# Patient Record
Sex: FEMALE | Race: WHITE | Employment: UNEMPLOYED | URBAN - METROPOLITAN AREA
[De-identification: names, ages, dates, MRNs, and addresses within clinical notes are randomized per-mention and may not be internally consistent; named-entity substitution may affect disease eponyms.]

---

## 2018-08-31 ENCOUNTER — TELEPHONE (OUTPATIENT)
Dept: FAMILY MEDICINE CLINIC | Age: 28
End: 2018-08-31

## 2018-08-31 ENCOUNTER — OFFICE VISIT (OUTPATIENT)
Dept: FAMILY MEDICINE CLINIC | Age: 28
End: 2018-08-31

## 2018-08-31 VITALS
WEIGHT: 125 LBS | OXYGEN SATURATION: 97 % | HEART RATE: 57 BPM | TEMPERATURE: 98.2 F | SYSTOLIC BLOOD PRESSURE: 116 MMHG | BODY MASS INDEX: 21.34 KG/M2 | DIASTOLIC BLOOD PRESSURE: 72 MMHG | RESPIRATION RATE: 18 BRPM | HEIGHT: 64 IN

## 2018-08-31 DIAGNOSIS — Z13.89: ICD-10-CM

## 2018-08-31 DIAGNOSIS — J45.909 UNCOMPLICATED ASTHMA, UNSPECIFIED ASTHMA SEVERITY, UNSPECIFIED WHETHER PERSISTENT: Primary | ICD-10-CM

## 2018-08-31 RX ORDER — ALBUTEROL SULFATE 0.83 MG/ML
2.5 SOLUTION RESPIRATORY (INHALATION) ONCE
Qty: 1 EACH | Refills: 0
Start: 2018-08-31 | End: 2018-08-31

## 2018-08-31 RX ORDER — ALBUTEROL SULFATE 90 UG/1
2 AEROSOL, METERED RESPIRATORY (INHALATION)
Qty: 1 INHALER | Refills: 1 | Status: SHIPPED | OUTPATIENT
Start: 2018-08-31 | End: 2019-07-08 | Stop reason: SDUPTHER

## 2018-08-31 NOTE — PROGRESS NOTES
1. Have you been to the ER, urgent care clinic since your last visit? Hospitalized since your last visit? Yes, [facility]    2. Have you seen or consulted any other health care providers outside of the [address] [place] [address] since your last visit? Include any pap smears or colon screening. No    Chief Complaint   Patient presents with    Other     chest congestion       Patient states has Nexplanon removal 08/30/18. Blood pressure 116/72, pulse (!) 57, temperature 98.2 °F (36.8 °C), temperature source Oral, resp. rate 18, height 5' 4\" (1.626 m), weight 125 lb (56.7 kg), SpO2 97 %, unknown if currently breastfeeding.

## 2018-08-31 NOTE — TELEPHONE ENCOUNTER
Patient called in stating that the Qvar that was sent in is not covered by her insurance.  She would like to know if there is an alternative that can be sent in?

## 2018-08-31 NOTE — MR AVS SNAPSHOT
[address] [phone] Patient: Attila Whalen MRN: [id number] XQE:[birth year] Visit Information Date & Time Provider Department Dept. Phone Encounter #  
 8/31/2018 10:45 AM Sergio Green MD [address] [place] [address] [phone] [id number] Upcoming Health Maintenance Date Due  
 HPV Age 9Y-34Y (1 of 3 - Female 3 Dose Series) 8/3/2003 Pneumococcal 19-64 Medium Risk (1 of 1 - PPSV23) 8/3/2011 PAP AKA CERVICAL CYTOLOGY 8/3/2013 Influenza Age 5 to Adult 8/1/2018 DTaP/Tdap/Td series (2 - Td) 6/21/2024 Allergies as of 8/31/2018  Review Complete On: 8/31/2018 By: Joann Daniels LPN Severity Noted Reaction Type Reactions Sulfa (Sulfonamide Antibiotics)  03/23/2011    Hives Current Immunizations  Reviewed on 8/31/2018 Name Date Tdap 6/21/2014  2:40 PM  
  
 Reviewed by Joann Daniels LPN on 2/95/8975 at 24:56 AM  
You Were Diagnosed With   
  
 Codes Comments Uncomplicated asthma, unspecified asthma severity, unspecified whether persistent    -  Primary ICD-10-CM: J45.909 ICD-9-CM: 493.90 Vitals BP Pulse Temp Resp Height(growth percentile) Weight(growth percentile) 116/72 (BP 1 Location: Right arm, BP Patient Position: Sitting) (!) 57 98.2 °F (36.8 °C) (Oral) 18 5' 4\" (1.626 m) 125 lb (56.7 kg) SpO2 Breastfeeding? BMI OB Status Smoking Status 97% Unknown 21.46 kg/m2 Chemically Induced Current Every Day Smoker BMI and BSA Data Body Mass Index Body Surface Area  
 21.46 kg/m 2 1.6 m 2 Preferred Pharmacy Pharmacy Name Phone [facility] [address] [phone] Your Updated Medication List  
  
   
This list is accurate as of 8/31/18 11:23 AM.  Always use your most recent med list.  
  
  
  
  
 * albuterol 2.5 mg /3 mL (0.083 %) nebulizer solution Commonly known as:  PROVENTIL VENTOLIN  
3 mL by Nebulization route once for 1 dose. * albuterol 90 mcg/actuation inhaler Commonly known as:  VENTOLIN HFA Take 2 Puffs by inhalation every six (6) hours as needed for Wheezing. beclomethasone 80 mcg/actuation [employer] Commonly known as:  QVAR Take 1 Puff by inhalation two (2) times a day. mupirocin 2 % ointment Commonly known as:  [facility] Apply  to affected area three (3) times daily. Apply to area for 10 days * Notice: This list has 2 medication(s) that are the same as other medications prescribed for you. Read the directions carefully, and ask your doctor or other care provider to review them with you. Prescriptions Sent to Pharmacy Refills  
 albuterol (VENTOLIN HFA) 90 mcg/actuation inhaler 1 Sig: Take 2 Puffs by inhalation every six (6) hours as needed for Wheezing. Class: Normal  
 Pharmacy: [facility] DR GUSTAVO RICHARD [address] [place] [address] Ph #: [phone] Route: Inhalation  
 beclomethasone (QVAR) 80 mcg/actuation aero 1 Sig: Take 1 Puff by inhalation two (2) times a day. Class: Normal  
 Pharmacy: [facility] DR UGSTAVO RICHARD [address] [place] [address] Ph #: [phone] Route: Inhalation We Performed the Following ALBUTEROL, INHAL. SOL., FDA-APPROVED FINAL, NON-COMPOUND UNIT DOSE, 1 MG [ [facility]] INHAL RX, AIRWAY OBST/DX SPUTUM INDUCT [id number] CPT(R)] Patient Instructions Asthma Action Plan: After Your Visit Your Care Instructions An asthma action plan is based on peak flow and asthma symptoms. Sorting symptoms and peak flow into red, yellow, and green \"zones\" can help you know how bad your asthma is and what actions you should take. Work with your doctor to make your plan. An action plan may include: · The peak flow readings and symptoms for each zone. · What medicines to take in each zone. · When to call a doctor. · A list of emergency contact numbers. · A list of your asthma triggers. Follow-up care is a key part of your treatment and safety. Be sure to make and go to all appointments, and call your doctor if you are having problems. It's also a good idea to know your test results and keep a list of the medicines you take. How can you care for yourself at home? · Take your daily medicines to help minimize long-term damage and avoid asthma attacks. · Check your peak flow every morning and evening. This is the best way to know how well your lungs are working. · Check your action plan to see what zone you are in. 
¨ If you are in the green zone, keep taking your daily asthma medicines as prescribed. ¨ If you are in the yellow zone, you may be having or will soon have an asthma attack. You may not have any symptoms, but your lungs are not working as well as they should. Take the medicines listed in your action plan. If you stay in the yellow zone, your doctor may need to increase the dose or add a medicine. ¨ If you are in the red zone, follow your action plan. If your symptoms or peak flow don't improve soon, you may need to go to the emergency room or be admitted to the hospital. 
· Use an asthma diary. Write down your peak flow readings in the asthma diary. If you have an attack, write down what caused it (if you know), the symptoms, and what medicine you took. · Make sure you know how and when to call your doctor or go to the hospital. 
· Take both the asthma action plan and the asthma diaryalong with your peak flow meter and medicineswhen you see your doctor. Tell your doctor if you are having trouble following your action plan. When should you call for help? Call 911 anytime you think you may need emergency care. For example, call if: 
· You have severe trouble breathing.  
Call your doctor now or seek immediate medical care if: 
· Your symptoms do not get better after you have followed your asthma action plan. · You cough up yellow, dark brown, or bloody mucus (sputum). Watch closely for changes in your health, and be sure to contact your doctor if: 
· Your coughing and wheezing get worse. · You need to use quick-relief medicine on more than 2 days a week (unless it is just for exercise). · You need help figuring out what is triggering your asthma attacks. Where can you learn more? Go to RFIDeas.be Enter 984 [id number] in the search box to learn more about \"Asthma Action Plan: After Your Visit. \"  
© 4972-3986 Healthwise, Incorporated. Care instructions adapted under license by [employer] (which disclaims liability or warranty for this information). This care instruction is for use with your licensed healthcare professional. If you have questions about a medical condition or this instruction, always ask your healthcare professional. [address] any warranty or liability for your use of this information. Content Version: 66.3.572314; Last Revised: March 9, 2012 Asthma Action Plan: After Your Visit Your Care Instructions An asthma action plan is based on peak flow and asthma symptoms. Sorting symptoms and peak flow into red, yellow, and green \"zones\" can help you know how bad your asthma is and what actions you should take. Work with your doctor to make your plan. An action plan may include: · The peak flow readings and symptoms for each zone. · What medicines to take in each zone. · When to call a doctor. · A list of emergency contact numbers. · A list of your asthma triggers. Follow-up care is a key part of your treatment and safety. Be sure to make and go to all appointments, and call your doctor if you are having problems. It's also a good idea to know your test results and keep a list of the medicines you take. How can you care for yourself at home?  
· Take your daily medicines to help minimize long-term damage and avoid asthma attacks. · Check your peak flow every morning and evening. This is the best way to know how well your lungs are working. · Check your action plan to see what zone you are in. 
¨ If you are in the green zone, keep taking your daily asthma medicines as prescribed. ¨ If you are in the yellow zone, you may be having or will soon have an asthma attack. You may not have any symptoms, but your lungs are not working as well as they should. Take the medicines listed in your action plan. If you stay in the yellow zone, your doctor may need to increase the dose or add a medicine. ¨ If you are in the red zone, follow your action plan. If your symptoms or peak flow don't improve soon, you may need to go to the emergency room or be admitted to the hospital. 
· Use an asthma diary. Write down your peak flow readings in the asthma diary. If you have an attack, write down what caused it (if you know), the symptoms, and what medicine you took. · Make sure you know how and when to call your doctor or go to the hospital. 
· Take both the asthma action plan and the asthma diaryalong with your peak flow meter and medicineswhen you see your doctor. Tell your doctor if you are having trouble following your action plan. When should you call for help? Call 911 anytime you think you may need emergency care. For example, call if: 
· You have severe trouble breathing. Call your doctor now or seek immediate medical care if: 
· Your symptoms do not get better after you have followed your asthma action plan. · You cough up yellow, dark brown, or bloody mucus (sputum). Watch closely for changes in your health, and be sure to contact your doctor if: 
· Your coughing and wheezing get worse. · You need to use quick-relief medicine on more than 2 days a week (unless it is just for exercise). · You need help figuring out what is triggering your asthma attacks. Where can you learn more? Go to DealExplorer.be Enter 933 [id number] in the search box to learn more about \"Asthma Action Plan: After Your Visit. \"  
© 6456-2939 Healthwise, Incorporated. Care instructions adapted under license by [facility] (which disclaims liability or warranty for this information). This care instruction is for use with your licensed healthcare professional. If you have questions about a medical condition or this instruction, always ask your healthcare professional. [address] [address] any warranty or liability for your use of this information. Content Version: 18.6.103807; Last Revised: March 9, 2012 Learning About Alcohol Misuse What is alcohol misuse? Alcohol misuse means drinking so much that it causes problems for you or others. Early problems with alcohol can start at home. You may argue with loved ones about how much you're drinking. Your job may be affected because of drinking. You may drink when it's dangerous or illegal, such as when you drive. Drinking too much for a long time can lead to health conditions like high blood pressure and liver problems. What are the symptoms? Symptoms of alcohol misuse may include: · Drinking much more than you planned. · Drinking even though it's causing problems for you or others. · Putting yourself in situations where you might get hurt. · Wanting to cut down or stop drinking, but not being able to. · Feeling guilty about how much you're drinking. How is alcohol misuse treated? Getting help for problems with alcohol is up to you. But you don't have to do it alone. There are many people and kinds of treatments to help with alcohol problems. Talking to your doctor is the first step. When you get a doctor's help, treatment for alcohol problems can be safer and quicker. Treatment options can include: · Treatment programs. Examples are group therapy, one or more types of counseling, and alcohol education. · Medicines. A doctor or counselor can help you know what kinds of medicines might help with cravings. · Free social support groups. These groups include AA (Alcoholics Anonymous) and SMART (Self-Management and Recovery Training). Your doctor can help you decide which type of program is best for you. Follow-up care is a key part of your treatment and safety. Be sure to make and go to all appointments, and call your doctor if you are having problems. It's also a good idea to know your test results and keep a list of the medicines you take. Where can you learn more? Go to http://haylieInOpenkatarina.info/. Enter [id number] in the search box to learn more about \"Learning About Alcohol Misuse. \" Current as of: October 9, 2017 Content Version: 11.7 © 4730-3321 [employer], Incorporated. Care instructions adapted under license by [employer] (which disclaims liability or warranty for this information). If you have questions about a medical condition or this instruction, always ask your healthcare professional. [address] [address] any warranty or liability for your use of this information. Learning About Alcohol Misuse What is alcohol misuse? Alcohol misuse means drinking so much that it causes problems for you or others. Early problems with alcohol can start at home. You may argue with loved ones about how much you're drinking. Your job may be affected because of drinking. You may drink when it's dangerous or illegal, such as when you drive. Drinking too much for a long time can lead to health conditions like high blood pressure and liver problems. What are the symptoms? Symptoms of alcohol misuse may include: · Drinking much more than you planned. · Drinking even though it's causing problems for you or others. · Putting yourself in situations where you might get hurt. · Wanting to cut down or stop drinking, but not being able to. · Feeling guilty about how much you're drinking. How is alcohol misuse treated? Getting help for problems with alcohol is up to you. But you don't have to do it alone. There are many people and kinds of treatments to help with alcohol problems. Talking to your doctor is the first step. When you get a doctor's help, treatment for alcohol problems can be safer and quicker. Treatment options can include: · Treatment programs. Examples are group therapy, one or more types of counseling, and alcohol education. · Medicines. A doctor or counselor can help you know what kinds of medicines might help with cravings. · Free social support groups. These groups include AA (Alcoholics Anonymous) and SMART (Self-Management and Recovery Training). Your doctor can help you decide which type of program is best for you. Follow-up care is a key part of your treatment and safety. Be sure to make and go to all appointments, and call your doctor if you are having problems. It's also a good idea to know your test results and keep a list of the medicines you take. Where can you learn more? Go to http://haylie-katarina.info/. Enter [id number] in the search box to learn more about \"Learning About Alcohol Misuse. \" Current as of: October 9, 2017 Content Version: 11.7 © 2533-1098 [employer]. Care instructions adapted under license by "[employer] (which disclaims liability or warranty for this information). If you have questions about a medical condition or this instruction, always ask your healthcare professional. [address] [address] any warranty or liability for your use of this information. Introducing [facility]! Ambrosio Garcia introduces W4 patient portal. Now you can access parts of your medical record, email your doctor's office, and request medication refills online. 1. In your internet browser, go to [email or web address] 2. Click on the First Time User? Click Here link in the Sign In box. You will see the New Member Sign Up page. 3. Enter your MediCard Access Code exactly as it appears below. You will not need to use this code after youve completed the sign-up process. If you do not sign up before the expiration date, you must request a new code. · MediCard Access Code: [email or web address] Expires: 11/29/2018 10:50 AM 
 
4. Enter the last four digits of your Social Security Number (xxxx) and Date of Birth (mm/dd/yyyy) as indicated and click Submit. You will be taken to the next sign-up page. 5. Create a MediCard ID. This will be your MediCard login ID and cannot be changed, so think of one that is secure and easy to remember. 6. Create a MediCard password. You can change your password at any time. 7. Enter your Password Reset Question and Answer. This can be used at a later time if you forget your password. 8. Enter your e-mail address. You will receive e-mail notification when new information is available in [address] 9. Click Sign Up. You can now view and download portions of your medical record. 10. Click the Download Summary menu link to download a portable copy of your medical information. If you have questions, please visit the Frequently Asked Questions section of the MediCard website. Remember, MediCard is NOT to be used for urgent needs. For medical emergencies, dial 911. Now available from your iPhone and Android! Please provide this summary of care documentation to your next provider. Your primary care clinician is listed as Tangela Biswas. If you have any questions after today's visit, please call [phone].

## 2018-08-31 NOTE — PROGRESS NOTES
[address] [facility] [address] 35   Office [phone], Fax [phone]      Subjective:     CC:cough and congestion  History provided by patient     HPI:  Mk Ventura is a 34 y.o. WHITE OR  female with significant history of Asthma  presenting for congestion and cough. Cough and congestion: Started 5 days ago. Report  Some sputum production. Reports that cough and congestion is worse in the morning. Reports wheezing. Has tried her sister's breathing treatment this morning, with melani relief. Patient non-compliant with medications and following up for asthma. Reports using inhaler on average 3 times a week and awakening at night time with cough at least one day a week. Patient chronic smoker. Smokes about 1ppd for the past 12 years. Works at [employer] as . Denies fever, chills, nausea or vomiting. Patient reports drinks 15-20 glasses of alcohol ( [place]ff) on a daily bases. C: yes  A: yes  G: no  E: No    Drugs: smokes THC    Patient not sexually active and LMP was 3 weeks ago. Of note, Had nexplanon removal procedure yesterday and had LFTs at [facility]. She doesn't know the result of LFTs but tolerated the procedure well. Review of Systems   Constitutional: Negative for chills and fever. HENT: Positive for congestion. Negative for ear pain and nosebleeds. Eyes: Negative for blurred vision. Respiratory: Positive for cough, sputum production, shortness of breath and wheezing. Negative for hemoptysis. Cardiovascular: Negative for chest pain, palpitations and leg swelling. Gastrointestinal: Positive for heartburn. Negative for abdominal pain, diarrhea, nausea and vomiting. Genitourinary: Negative for dysuria and urgency. Psychiatric/Behavioral: Negative for depression.        Past Medical History:   Diagnosis Date    Asthma     HSV infection January 2011       Current Outpatient Prescriptions on File Prior to Visit   Medication Sig Dispense Refill    mupirocin (BACTROBAN) 2 % ointment Apply  to affected area three (3) times daily. Apply to area for 10 days 22 g 0     No current facility-administered medications on file prior to visit. Allergies   Allergen Reactions    Sulfa (Sulfonamide Antibiotics) Hives       Past Surgical History:   Procedure Laterality Date    HX ENDOSCOPY      HX WISDOM TEETH EXTRACTION  3/12/10       Social History     Social History    Marital status: SINGLE     Spouse name: N/A    Number of children: N/A    Years of education: N/A     Occupational History    Not on file. Social History Main Topics    Smoking status: Current Every Day Smoker     Packs/day: 1.00     Years: 5.00     Types: Cigarettes    Smokeless tobacco: Never Used    Alcohol use No    Drug use: Yes     Special: Marijuana    Sexual activity: Yes     Partners: Male     Birth control/ protection: Condom, Pill     Other Topics Concern    Not on file     Social History Narrative       Patient Active Problem List   Diagnosis Code    RAD (reactive airway disease) J45.909    Lower back pain M54.5    Herpes B00.9    H/O drug abuse Z87.898    Tobacco abuse Z72.0         Objective:   Vitals - reviewed  Visit Vitals    /72 (BP 1 Location: Right arm, BP Patient Position: Sitting)    Pulse (!) 57    Temp 98.2 °F (36.8 °C) (Oral)    Resp 18    Ht 5' 4\" (1.626 m)    Wt 125 lb (56.7 kg)    SpO2 97%    Breastfeeding Unknown  Comment: patient had nexplanon removed 08/30/18    BMI 21.46 kg/m2        Physical Exam   Constitutional: She appears well-developed and well-nourished. HENT:   Head: Normocephalic and atraumatic. Eyes: Conjunctivae are normal. Pupils are equal, round, and reactive to light. Neck: Normal range of motion. Neck supple. Cardiovascular: Normal rate and regular rhythm. Pulmonary/Chest: Effort normal. No respiratory distress. She has wheezes. She exhibits no tenderness. Abdominal: Soft.  Bowel sounds are normal. She exhibits no distension. Assessment and orders:   Diagnoses and all orders for this visit:    1. Uncomplicated asthma, unspecified asthma severity, unspecified whether persistent: Mild-moderate persistent asthma refractory to NOLBERTO. Patient moving air but with expiratory wheezes throughout lung fields. Neb treatment here in the office. Will add inhaled corticosteroid to her regimen. -     albuterol (PROVENTIL VENTOLIN) 2.5 mg /3 mL (0.083 %) nebulizer solution; 3 mL by Nebulization route once for 1 dose. -     ALBUTEROL, INHAL. ZUW.()  -     INHAL RX, AIRWAY OBST/DX SPUTUM INDUCT ([id number])    2. Positive CAGE assessment for alcohol abuse  - Offered referral to addiction medicine for alcohol abuse but patient declines  - Deferring LFTs/CMP at annual physical exam per patient's request  - Discussed about dangers of alcohol including liver cirrhosis briefly  - Patient to make an appointment for annual physical exam    Other orders  -     albuterol (VENTOLIN HFA) 90 mcg/actuation inhaler; Take 2 Puffs by inhalation every six (6) hours as needed for Wheezing.  -     beclomethasone (QVAR) 80 mcg/actuation aero; Take 1 Puff by inhalation two (2) times a day. Pt was discussed with Dr. Tiffany Hoffman (attending physician). I have reviewed patient medical and social history and medications. I have reviewed pertinent labs results and other data. I have discussed the diagnosis with the patient and the intended plan as seen in the above orders. The patient has received an after-visit summary and questions were answered concerning future plans. I have discussed medication side effects and warnings with the patient as well.     Hermilo Hernandez MD  Resident [facility]  09/03/18

## 2018-08-31 NOTE — PATIENT INSTRUCTIONS
Asthma Action Plan: After Your Visit  Your Care Instructions  An asthma action plan is based on peak flow and asthma symptoms. Sorting symptoms and peak flow into red, yellow, and green \"zones\" can help you know how bad your asthma is and what actions you should take. Work with your doctor to make your plan. An action plan may include:  · The peak flow readings and symptoms for each zone. · What medicines to take in each zone. · When to call a doctor. · A list of emergency contact numbers. · A list of your asthma triggers. Follow-up care is a key part of your treatment and safety. Be sure to make and go to all appointments, and call your doctor if you are having problems. It's also a good idea to know your test results and keep a list of the medicines you take. How can you care for yourself at home? · Take your daily medicines to help minimize long-term damage and avoid asthma attacks. · Check your peak flow every morning and evening. This is the best way to know how well your lungs are working. · Check your action plan to see what zone you are in.  ¨ If you are in the green zone, keep taking your daily asthma medicines as prescribed. ¨ If you are in the yellow zone, you may be having or will soon have an asthma attack. You may not have any symptoms, but your lungs are not working as well as they should. Take the medicines listed in your action plan. If you stay in the yellow zone, your doctor may need to increase the dose or add a medicine. ¨ If you are in the red zone, follow your action plan. If your symptoms or peak flow don't improve soon, you may need to go to the emergency room or be admitted to the hospital.  · Use an asthma diary. Write down your peak flow readings in the asthma diary. If you have an attack, write down what caused it (if you know), the symptoms, and what medicine you took.   · Make sure you know how and when to call your doctor or go to the hospital.  · Take both the asthma action plan and the asthma diary--along with your peak flow meter and medicines--when you see your doctor. Tell your doctor if you are having trouble following your action plan. When should you call for help? Call 911 anytime you think you may need emergency care. For example, call if:  · You have severe trouble breathing. Call your doctor now or seek immediate medical care if:  · Your symptoms do not get better after you have followed your asthma action plan. · You cough up yellow, dark brown, or bloody mucus (sputum). Watch closely for changes in your health, and be sure to contact your doctor if:  · Your coughing and wheezing get worse. · You need to use quick-relief medicine on more than 2 days a week (unless it is just for exercise). · You need help figuring out what is triggering your asthma attacks. Where can you learn more? Go to [employer]  Enter B511 in the search box to learn more about \"Asthma Action Plan: After Your Visit. \"   © 6678-2424 Healthwise, Incorporated. Care instructions adapted under license by [facility] (which disclaims liability or warranty for this information). This care instruction is for use with your licensed healthcare professional. If you have questions about a medical condition or this instruction, always ask your healthcare professional. [address] any warranty or liability for your use of this information. Content Version: 02.8.825139; Last Revised: March 9, 2012              Asthma Action Plan: After Your Visit  Your Care Instructions  An asthma action plan is based on peak flow and asthma symptoms. Sorting symptoms and peak flow into red, yellow, and green \"zones\" can help you know how bad your asthma is and what actions you should take. Work with your doctor to make your plan. An action plan may include:  · The peak flow readings and symptoms for each zone. · What medicines to take in each zone.   · When to call a doctor. · A list of emergency contact numbers. · A list of your asthma triggers. Follow-up care is a key part of your treatment and safety. Be sure to make and go to all appointments, and call your doctor if you are having problems. It's also a good idea to know your test results and keep a list of the medicines you take. How can you care for yourself at home? · Take your daily medicines to help minimize long-term damage and avoid asthma attacks. · Check your peak flow every morning and evening. This is the best way to know how well your lungs are working. · Check your action plan to see what zone you are in.  ¨ If you are in the green zone, keep taking your daily asthma medicines as prescribed. ¨ If you are in the yellow zone, you may be having or will soon have an asthma attack. You may not have any symptoms, but your lungs are not working as well as they should. Take the medicines listed in your action plan. If you stay in the yellow zone, your doctor may need to increase the dose or add a medicine. ¨ If you are in the red zone, follow your action plan. If your symptoms or peak flow don't improve soon, you may need to go to the emergency room or be admitted to the hospital.  · Use an asthma diary. Write down your peak flow readings in the asthma diary. If you have an attack, write down what caused it (if you know), the symptoms, and what medicine you took. · Make sure you know how and when to call your doctor or go to the hospital.  · Take both the asthma action plan and the asthma diary--along with your peak flow meter and medicines--when you see your doctor. Tell your doctor if you are having trouble following your action plan. When should you call for help? Call 911 anytime you think you may need emergency care. For example, call if:  · You have severe trouble breathing.   Call your doctor now or seek immediate medical care if:  · Your symptoms do not get better after you have followed your asthma action plan. · You cough up yellow, dark brown, or bloody mucus (sputum). Watch closely for changes in your health, and be sure to contact your doctor if:  · Your coughing and wheezing get worse. · You need to use quick-relief medicine on more than 2 days a week (unless it is just for exercise). · You need help figuring out what is triggering your asthma attacks. Where can you learn more? Go to [employer]  Enter B511 in the search box to learn more about \"Asthma Action Plan: After Your Visit. \"   © 0229-3355 Healthwise, Incorporated. Care instructions adapted under license by [employer] (which disclaims liability or warranty for this information). This care instruction is for use with your licensed healthcare professional. If you have questions about a medical condition or this instruction, always ask your healthcare professional. [place] [address] any warranty or liability for your use of this information. Content Version: 57.9.693445; Last Revised: March 9, 2012                 Learning About Alcohol Misuse  What is alcohol misuse? Alcohol misuse means drinking so much that it causes problems for you or others. Early problems with alcohol can start at home. You may argue with loved ones about how much you're drinking. Your job may be affected because of drinking. You may drink when it's dangerous or illegal, such as when you drive. Drinking too much for a long time can lead to health conditions like high blood pressure and liver problems. What are the symptoms? Symptoms of alcohol misuse may include:  · Drinking much more than you planned. · Drinking even though it's causing problems for you or others. · Putting yourself in situations where you might get hurt. · Wanting to cut down or stop drinking, but not being able to. · Feeling guilty about how much you're drinking. How is alcohol misuse treated? Getting help for problems with alcohol is up to you. But you don't have to do it alone. There are many people and kinds of treatments to help with alcohol problems. Talking to your doctor is the first step. When you get a doctor's help, treatment for alcohol problems can be safer and quicker. Treatment options can include:  · Treatment programs. Examples are group therapy, one or more types of counseling, and alcohol education. · Medicines. A doctor or counselor can help you know what kinds of medicines might help with cravings. · Free social support groups. These groups include AA (Alcoholics Anonymous) and SMART (Self-Management and Recovery Training). Your doctor can help you decide which type of program is best for you. Follow-up care is a key part of your treatment and safety. Be sure to make and go to all appointments, and call your doctor if you are having problems. It's also a good idea to know your test results and keep a list of the medicines you take. Where can you learn more? Go to http://[employer]OneHealth Solutionskatarina.info/. Enter [id number] [id number] in the search box to learn more about \"Learning About Alcohol Misuse. \"  Current as of: October 9, 2017  Content Version: 11.7  © 0726-6199 [employer]. Care instructions adapted under license by [employer] (which disclaims liability or warranty for this information). If you have questions about a medical condition or this instruction, always ask your healthcare professional. [address] any warranty or liability for your use of this information. Learning About Alcohol Misuse  What is alcohol misuse? Alcohol misuse means drinking so much that it causes problems for you or others. Early problems with alcohol can start at home. You may argue with loved ones about how much you're drinking. Your job may be affected because of drinking. You may drink when it's dangerous or illegal, such as when you drive.   Drinking too much for a long time can lead to health conditions like high blood pressure and liver problems. What are the symptoms? Symptoms of alcohol misuse may include:  · Drinking much more than you planned. · Drinking even though it's causing problems for you or others. · Putting yourself in situations where you might get hurt. · Wanting to cut down or stop drinking, but not being able to. · Feeling guilty about how much you're drinking. How is alcohol misuse treated? Getting help for problems with alcohol is up to you. But you don't have to do it alone. There are many people and kinds of treatments to help with alcohol problems. Talking to your doctor is the first step. When you get a doctor's help, treatment for alcohol problems can be safer and quicker. Treatment options can include:  · Treatment programs. Examples are group therapy, one or more types of counseling, and alcohol education. · Medicines. A doctor or counselor can help you know what kinds of medicines might help with cravings. · Free social support groups. These groups include AA (Alcoholics Anonymous) and SMART (Self-Management and Recovery Training). Your doctor can help you decide which type of program is best for you. Follow-up care is a key part of your treatment and safety. Be sure to make and go to all appointments, and call your doctor if you are having problems. It's also a good idea to know your test results and keep a list of the medicines you take. Where can you learn more? Go to http://haylie-katarina.info/. Enter [id number] in the search box to learn more about \"Learning About Alcohol Misuse. \"  Current as of: October 9, 2017  Content Version: 11.7  © 4575-5857 [employer]. Care instructions adapted under license by [employer] (which disclaims liability or warranty for this information).  If you have questions about a medical condition or this instruction, always ask your healthcare professional. [place] [address] any warranty or liability for your use of this information.

## 2018-09-05 NOTE — TELEPHONE ENCOUNTER
Notified patient per Dr. Jerod Tucker alternative inhaler Rhenda Massing has been sent to pharmacy. Patient states she  picked up QVAR inhaler prescribed last Friday(8/31/18).

## 2019-07-08 ENCOUNTER — OFFICE VISIT (OUTPATIENT)
Dept: FAMILY MEDICINE CLINIC | Age: 29
End: 2019-07-08

## 2019-07-08 VITALS
HEIGHT: 64 IN | RESPIRATION RATE: 18 BRPM | HEART RATE: 98 BPM | BODY MASS INDEX: 20.01 KG/M2 | SYSTOLIC BLOOD PRESSURE: 127 MMHG | TEMPERATURE: 98.1 F | OXYGEN SATURATION: 98 % | WEIGHT: 117.2 LBS | DIASTOLIC BLOOD PRESSURE: 71 MMHG

## 2019-07-08 DIAGNOSIS — L02.91 ABSCESS: Primary | ICD-10-CM

## 2019-07-08 DIAGNOSIS — Z83.49 FAMILY HISTORY OF VITAMIN D DEFICIENCY: ICD-10-CM

## 2019-07-08 DIAGNOSIS — Z86.39 HISTORY OF HYPERTHYROIDISM: ICD-10-CM

## 2019-07-08 DIAGNOSIS — Z83.3 FAMILY HISTORY OF DIABETES MELLITUS (DM): ICD-10-CM

## 2019-07-08 DIAGNOSIS — F19.90 DRUG USE: ICD-10-CM

## 2019-07-08 DIAGNOSIS — Z00.00 WELLNESS EXAMINATION: ICD-10-CM

## 2019-07-08 RX ORDER — ALBUTEROL SULFATE 90 UG/1
2 AEROSOL, METERED RESPIRATORY (INHALATION)
Qty: 1 INHALER | Refills: 5 | Status: SHIPPED | OUTPATIENT
Start: 2019-07-08

## 2019-07-08 NOTE — PATIENT INSTRUCTIONS
Skin Abscess: Care Instructions  Your Care Instructions    A skin abscess is a bacterial infection that forms a pocket of pus. A boil is a kind of skin abscess. The doctor may have cut an opening in the abscess so that the pus can drain out. You may have gauze in the cut so that the abscess will stay open and keep draining. You may need antibiotics. You will need to follow up with your doctor to make sure the infection has gone away. The doctor has checked you carefully, but problems can develop later. If you notice any problems or new symptoms, get medical treatment right away. Follow-up care is a key part of your treatment and safety. Be sure to make and go to all appointments, and call your doctor if you are having problems. It's also a good idea to know your test results and keep a list of the medicines you take. How can you care for yourself at home? · Apply warm and dry compresses, a heating pad set on low, or a hot water bottle 3 or 4 times a day for pain. Keep a cloth between the heat source and your skin. · If your doctor prescribed antibiotics, take them as directed. Do not stop taking them just because you feel better. You need to take the full course of antibiotics. · Take pain medicines exactly as directed. ? If the doctor gave you a prescription medicine for pain, take it as prescribed. ? If you are not taking a prescription pain medicine, ask your doctor if you can take an over-the-counter medicine. · Keep your bandage clean and dry. Change the bandage whenever it gets wet or dirty, or at least one time a day. · If the abscess was packed with gauze:  ? Keep follow-up appointments to have the gauze changed or removed. If the doctor instructed you to remove the gauze, follow the instructions you were given for how to remove it. ? After the gauze is removed, soak the area in warm water for 15 to 20 minutes 2 times a day, until the wound closes. When should you call for help?   Call your doctor now or seek immediate medical care if:    · You have signs of worsening infection, such as:  ? Increased pain, swelling, warmth, or redness. ? Red streaks leading from the infected skin. ? Pus draining from the wound. ? A fever.    Watch closely for changes in your health, and be sure to contact your doctor if:    · You do not get better as expected. Where can you learn more? Go to http://haylie-katarina.info/. Enter J572 in the search box to learn more about \"Skin Abscess: Care Instructions. \"  Current as of: April 17, 2018  Content Version: 11.9  © 9063-7747 [employer]. Care instructions adapted under license by [employer] (which disclaims liability or warranty for this information). If you have questions about a medical condition or this instruction, always ask your healthcare professional. [address] any warranty or liability for your use of this information.

## 2019-07-08 NOTE — PROGRESS NOTES
Identified Patient with two Patient identifiers (Name and ). Two Patient Identifiers confirmed. Reviewed record in preparation for visit and have obtained necessary documentation. Chief Complaint   Patient presents with    Skin Problem     Abcess - Left Axillary     Labs     Family Hx of Diabetes and Hypoglycemia - c/o tingling sensation hands and feet, frequent urination, dry mouth, blurred visionwith corrective lenses, headaches, delayed healing and dry skin with itching, thinning hair (Hx of Overactive Thyroid)         Visit Vitals  /71 (BP 1 Location: Right arm, BP Patient Position: Sitting)   Pulse 98   Temp 98.1 °F (36.7 °C) (Oral)   Resp 18   Ht 5' 4\" (1.626 m)   Wt 117 lb 3.2 oz (53.2 kg)   SpO2 98%   BMI 20.12 kg/m²       1. Have you been to the ER, urgent care clinic since your last visit? Hospitalized since your last visit? May 2019 [facility] Abcess Left Eyebrow    2. Have you seen or consulted any other health care providers outside of the [address] [place] [address] since your last visit? Include any pap smears or colon screening.  No

## 2019-07-08 NOTE — PROGRESS NOTES
Yrn Palencia is an 34 y.o. female presents for absecss in left underarm and lab. Patient woke up yesterday morning and felt a small pimple that was prupritic that then progressively got larger and tender. Has tried applying warm compresses. Denies any systemic symptoms. Hyperthyroid  Patient was diagnosed with \"overactive thyroid\" 5 years ago. Was told to follow up to initiate treatment but failed to do so. Patient states she has palpitations \"all the time\" since she remembers. Denies any chest pain. States she has had diarrhea and weight loss \"for a very long time\". Family history of diabetes. Patient has a significant family history of diabetes in both her fathers and mothers side. Concerned she might have it too. Endorses polyuria, polyphagia and weight loss. History of substance abuse and Nicotine dependence. Patient is currently undergoing treatment for substance abuse. She goes to meetings regularly. Has a 1year old son and is motivated to be healthy. She would like to get tested for HepC and HIV given she has used dirty needles from people who have tested positive in the past. At this time she states she is looking forward to quitting smoking. Review of Systems   Review of Systems   Constitutional: Positive for weight loss. Negative for chills and fever. Respiratory: Negative for cough, shortness of breath and wheezing. Cardiovascular: Positive for palpitations. Negative for chest pain. Gastrointestinal: Negative for abdominal pain, diarrhea, nausea and vomiting. Genitourinary: Negative for dysuria. Polyuria     Neurological: Negative for dizziness. Endo/Heme/Allergies: Positive for polydipsia. Psychiatric/Behavioral: Negative for suicidal ideas. The patient is not nervous/anxious.           Allergies   Allergies   Allergen Reactions    Sulfa (Sulfonamide Antibiotics) Hives       Medications  Current Outpatient Medications   Medication Sig  albuterol (VENTOLIN HFA) 90 mcg/actuation inhaler Take 2 Puffs by inhalation every six (6) hours as needed for Wheezing.  fluticasone furoate (ARNUITY ELLIPTA) 100 mcg/actuation dsdv inhaler Take 1 Puff by inhalation daily. No current facility-administered medications for this visit. Medical History  Past Medical History:   Diagnosis Date    Asthma     HSV infection January 2011       Immunizations   Immunization History   Administered Date(s) Administered    Tdap 06/21/2014          Past Surgical History:   Procedure Laterality Date    HX ENDOSCOPY      HX WISDOM TEETH EXTRACTION  3/12/10     Family History   Problem Relation Age of Onset    Cancer Mother 39        Lung Cancer that spread    Asthma Mother     Heart Disease Father         area of heart not getting enough oxygen    Asthma Sister     Other Maternal Uncle         Hernia    Other Sister         Bronchitis     Social History     Tobacco Use    Smoking status: Current Every Day Smoker     Packs/day: 1.00     Years: 5.00     Pack years: 5.00     Types: Cigarettes    Smokeless tobacco: Never Used   Substance Use Topics    Alcohol use: No       Objective   Vital Signs  Visit Vitals  /71 (BP 1 Location: Right arm, BP Patient Position: Sitting)   Pulse 98   Temp 98.1 °F (36.7 °C) (Oral)   Resp 18   Ht 5' 4\" (1.626 m)   Wt 117 lb 3.2 oz (53.2 kg)   SpO2 98%   BMI 20.12 kg/m²       Physical Exam  General appearance - Alert, NAD. Eyes: EOMI. Sclera white. Heart - Normal rate, regular rhythm. No m/r/r  Abdomen - Soft, non tender. Non distended. Neurological - No focal deficits. Speech normal.   Musculoskeletal - Normal ROM, Gait normal.    Extremities - No LE edema. Distal pulses intact  Skin - Please refer to picture below. Painful, fluctuant, erythematous nodule, without surrounding cellulitis         Assessment   Camden Hicks is a 34 y.o. who presents for  Abscess in left axilla and lab work. Plan   1.  Abscess in left axilla: Patient with painful, fluctuant nodule with no systemic symptoms   - Patient has follow up scheduled tomorrow morning for ultrasound and I&D if needed. 2. History of hyperthyroidism  - Follow up TSH, T3 and T4    3. Polyuria, polydypsia and weight loss in setting of family history of diabetes. - HgbA1C  - CMP    4. History of substance abuse and nicotine dependence  - HepC Ab and HIV  - Will schedule a follow up appointment to address nicotine dependence and go over different strategies to help her quit. I have discussed the aforementioned diagnoses and plan with the patient in detail. I have provided information in person and/or in AVS. All questions answered prior to discharge.       I discussed this patient with Dr. Howard Schmid (Attending Physician)   Signed By:  Mohit Diana MD    Family Medicine Resident

## 2019-07-09 ENCOUNTER — OFFICE VISIT (OUTPATIENT)
Dept: FAMILY MEDICINE CLINIC | Age: 29
End: 2019-07-09

## 2019-07-09 VITALS — WEIGHT: 117 LBS | BODY MASS INDEX: 20.08 KG/M2

## 2019-07-09 DIAGNOSIS — L02.91 ABSCESS: Primary | ICD-10-CM

## 2019-07-09 NOTE — PATIENT INSTRUCTIONS
Skin Abscess: Care Instructions  Your Care Instructions    A skin abscess is a bacterial infection that forms a pocket of pus. A boil is a kind of skin abscess. The doctor may have cut an opening in the abscess so that the pus can drain out. You may have gauze in the cut so that the abscess will stay open and keep draining. You may need antibiotics. You will need to follow up with your doctor to make sure the infection has gone away. The doctor has checked you carefully, but problems can develop later. If you notice any problems or new symptoms, get medical treatment right away. Follow-up care is a key part of your treatment and safety. Be sure to make and go to all appointments, and call your doctor if you are having problems. It's also a good idea to know your test results and keep a list of the medicines you take. How can you care for yourself at home? · Apply warm and dry compresses, a heating pad set on low, or a hot water bottle 3 or 4 times a day for pain. Keep a cloth between the heat source and your skin. · If your doctor prescribed antibiotics, take them as directed. Do not stop taking them just because you feel better. You need to take the full course of antibiotics. · Take pain medicines exactly as directed. ? If the doctor gave you a prescription medicine for pain, take it as prescribed. ? If you are not taking a prescription pain medicine, ask your doctor if you can take an over-the-counter medicine. · Keep your bandage clean and dry. Change the bandage whenever it gets wet or dirty, or at least one time a day. · If the abscess was packed with gauze:  ? Keep follow-up appointments to have the gauze changed or removed. If the doctor instructed you to remove the gauze, follow the instructions you were given for how to remove it. ? After the gauze is removed, soak the area in warm water for 15 to 20 minutes 2 times a day, until the wound closes. When should you call for help?   Call your doctor now or seek immediate medical care if:    · You have signs of worsening infection, such as:  ? Increased pain, swelling, warmth, or redness. ? Red streaks leading from the infected skin. ? Pus draining from the wound. ? A fever.    Watch closely for changes in your health, and be sure to contact your doctor if:    · You do not get better as expected. Where can you learn more? Go to http://haylie-katarina.info/. Enter D744 in the search box to learn more about \"Skin Abscess: Care Instructions. \"  Current as of: April 17, 2018  Content Version: 11.9  © 8172-1833 [employer]. Care instructions adapted under license by [employer] (which disclaims liability or warranty for this information). If you have questions about a medical condition or this instruction, always ask your healthcare professional. [address] any warranty or liability for your use of this information.

## 2019-07-09 NOTE — PROGRESS NOTES
Kevan Agudelo is an 34 y.o. female presents for Incision and Drainage of abscess in Left axilla. A couple of days ago patient presented with \"small pimple\" that progressively got larger and tender in left underarm. Was evaluated in clinic yesterday and was found to have a painful, fluctuant, erythematous nodule,  without surrounding cellulitis and no drainage consistent with an abscess. She was told to come this morning for ultrasound and I&D. Patient denies any fever, chills or worsening tenderness. [facility]  OFFICE PROCEDURE PROGRESS NOTE  ULTRASOUND AND I&D        Chart reviewed for the following:              I, José Luis Mayo MD, have reviewed the History, Physical and updated the Allergic reactions for [address] performed immediately prior to start of procedure:              Steven Banerjee MD, have performed the following reviews on Tiffany Chin prior to the start of the procedure:            * Patient was identified by name and date of birth   * Agreement on procedure being performed was verified  * Risks and Benefits explained to the patient  * Procedure site verified and marked as necessary  * Patient was positioned for comfort  * Consent was signed and verified                Time: 8:25AM   Date of procedure: 7/9/2019  Procedure performed by: José Luis Mayo MD  Provider assisted by: Dr. Kel Gilliam (Attending physician)  Patient assisted by: self  How tolerated by patient: tolerated the procedure well with no complications      Procedure: Point-of-Care Ultrasound of Superficial Skin Lesion  Indication: L axilla abscess     Findings:  Using the GE Logic e ultrasound with a linear probe, gray-scaled imaging evaluation of the L axilla was performed. Ultrasound showed approx 2cm x 4cm fluid-filled area with area of soft tissue swelling inferior to this.      Impression:  Abscess approx 2cm x 4cm     Procedure:  Incision and Drainage  After informed consent was obtained,    chlorhexidine was used for cleansing. 1.5mL of 1% lidocaine was administered for anesthetic. Using sterile technique, incision and drainage of abscess was performed. 1cm incision made with a moderate amount of bloody pus appreciated from lesion. Pressure dressing was applied, and wound care instructions provided. Be alert for any signs of cutaneous infection. The procedure was well tolerated without complications. Follow up: 2 days for packing to be removed and new packing placed. Review of Systems   Constitutional: Negative for chills and fever. Skin:        Painful induration left axilla     Psychiatric/Behavioral: The patient is nervous/anxious. Allergies   Allergies   Allergen Reactions    Sulfa (Sulfonamide Antibiotics) Hives       Medications  Current Outpatient Medications   Medication Sig    albuterol (VENTOLIN HFA) 90 mcg/actuation inhaler Take 2 Puffs by inhalation every six (6) hours as needed for Wheezing.  [id number] chanelle-folic-dha (PRENATAL DHA+COMPLETE PRENATAL) -300 mg-mcg-mg cmpk Take  by mouth.  fluticasone furoate (ARNUITY ELLIPTA) 100 mcg/actuation dsdv inhaler Take 1 Puff by inhalation daily. No current facility-administered medications for this visit.         Medical History  Past Medical History:   Diagnosis Date    Asthma     HSV infection January 2011       Immunizations   Immunization History   Administered Date(s) Administered    Tdap 06/21/2014          Past Surgical History:   Procedure Laterality Date    HX ENDOSCOPY      HX WISDOM TEETH EXTRACTION  3/12/10     Family History   Problem Relation Age of Onset    Cancer Mother 39        Lung Cancer that spread    Asthma Mother     Heart Disease Father         area of heart not getting enough oxygen    Asthma Sister     Other Maternal Uncle         Hernia    Other Sister         Bronchitis     Social History     Tobacco Use    Smoking status: Current Every Day Smoker     Packs/day: 1.00     Years: 5.00     Pack years: 5.00     Types: Cigarettes    Smokeless tobacco: Never Used   Substance Use Topics    Alcohol use: No       Objective   Vital Signs  Visit Vitals  Wt 117 lb (53.1 kg)   BMI 20.08 kg/m²       Physical Exam  General appearance - Alert, NAD. Head: Atraumatic. Normocephalic. No lymphadenopathy  Eyes: EOMI. Sclera white. Ears: Hearing grossly normal. TM non erythematous with no effusion   Nose: Nares patent, no polyps  Throat: pharynx clear, no exudates. Respiratory - LCTAB. No wheeze/rale/rhonchi  Heart - Normal rate, regular rhythm. No m/r/r  Abdomen - Soft, non tender. Non distended. Neurological - No focal deficits. Speech normal.   Skin -Painful, fluctuant, erythematous nodule, with or without surrounding cellulitis   Assessment   Lisseth Nance is a 34 y.o. who presents for  Incision and Drainage of abscess in Left axilla. Plan   1. Left Axilla Abscess: I&D performed. - CULTURE, ANAEROBIC AND AEROBIC sent  - No antibiotics necessary at this time. I have discussed the aforementioned diagnoses and plan with the patient in detail. I have provided information in person and/or in AVS. All questions answered prior to discharge.       I discussed this patient with Dr. El Garcia (Attending Physician)   Signed By:  Kellie Dee MD    Family Medicine Resident

## 2019-07-09 NOTE — PROGRESS NOTES
Identified Patient with two Patient identifiers (Name and ). Two Patient Identifiers confirmed. Reviewed record in preparation for visit and have obtained necessary documentation. Chief Complaint   Patient presents with    Abscess     Abscess Drainage Left Axillary        Visit Vitals  Wt 117 lb (53.1 kg)   BMI 20.08 kg/m²       1. Have you been to the ER, urgent care clinic since your last visit? Hospitalized since your last visit? No    2. Have you seen or consulted any other health care providers outside of the [address] [place] [address] since your last visit? Include any pap smears or colon screening.  No

## 2019-07-10 LAB
25(OH)D3+25(OH)D2 SERPL-MCNC: 29.4 NG/ML (ref 30–100)
ALBUMIN SERPL-MCNC: 4.3 G/DL (ref 3.5–5.5)
ALBUMIN/GLOB SERPL: 1.5 {RATIO} (ref 1.2–2.2)
ALP SERPL-CCNC: 49 IU/L (ref 39–117)
ALT SERPL-CCNC: 6 IU/L (ref 0–32)
AST SERPL-CCNC: 11 IU/L (ref 0–40)
BASOPHILS # BLD AUTO: 0 X10E3/UL (ref 0–0.2)
BASOPHILS NFR BLD AUTO: 1 %
BILIRUB SERPL-MCNC: 0.3 MG/DL (ref 0–1.2)
BUN SERPL-MCNC: 8 MG/DL (ref 6–20)
BUN/CREAT SERPL: 9 (ref 9–23)
CALCIUM SERPL-MCNC: 9.8 MG/DL (ref 8.7–10.2)
CHLORIDE SERPL-SCNC: 102 MMOL/L (ref 96–106)
CO2 SERPL-SCNC: 25 MMOL/L (ref 20–29)
CREAT SERPL-MCNC: 0.88 MG/DL (ref 0.57–1)
EOSINOPHIL # BLD AUTO: 0.4 X10E3/UL (ref 0–0.4)
EOSINOPHIL NFR BLD AUTO: 4 %
ERYTHROCYTE [DISTWIDTH] IN BLOOD BY AUTOMATED COUNT: 14.1 % (ref 12.3–15.4)
EST. AVERAGE GLUCOSE BLD GHB EST-MCNC: 105 MG/DL
GLOBULIN SER CALC-MCNC: 2.8 G/DL (ref 1.5–4.5)
GLUCOSE SERPL-MCNC: 78 MG/DL (ref 65–99)
HBA1C MFR BLD: 5.3 % (ref 4.8–5.6)
HCT VFR BLD AUTO: 37.2 % (ref 34–46.6)
HCV AB S/CO SERPL IA: <0.1 S/CO RATIO (ref 0–0.9)
HGB BLD-MCNC: 12.5 G/DL (ref 11.1–15.9)
HIV 1+2 AB+HIV1 P24 AG SERPL QL IA: NON REACTIVE
IMM GRANULOCYTES # BLD AUTO: 0 X10E3/UL (ref 0–0.1)
IMM GRANULOCYTES NFR BLD AUTO: 0 %
LYMPHOCYTES # BLD AUTO: 2.4 X10E3/UL (ref 0.7–3.1)
LYMPHOCYTES NFR BLD AUTO: 28 %
MCH RBC QN AUTO: 32.1 PG (ref 26.6–33)
MCHC RBC AUTO-ENTMCNC: 33.6 G/DL (ref 31.5–35.7)
MCV RBC AUTO: 96 FL (ref 79–97)
MONOCYTES # BLD AUTO: 0.7 X10E3/UL (ref 0.1–0.9)
MONOCYTES NFR BLD AUTO: 8 %
NEUTROPHILS # BLD AUTO: 5.1 X10E3/UL (ref 1.4–7)
NEUTROPHILS NFR BLD AUTO: 59 %
PLATELET # BLD AUTO: 309 X10E3/UL (ref 150–450)
POTASSIUM SERPL-SCNC: 5.2 MMOL/L (ref 3.5–5.2)
PROT SERPL-MCNC: 7.1 G/DL (ref 6–8.5)
RBC # BLD AUTO: 3.89 X10E6/UL (ref 3.77–5.28)
SODIUM SERPL-SCNC: 141 MMOL/L (ref 134–144)
T3REVERSE SERPL-MCNC: 23 NG/DL (ref 9.2–24.1)
T4 FREE SERPL-MCNC: 1.19 NG/DL (ref 0.82–1.77)
TSH SERPL DL<=0.005 MIU/L-ACNC: 1.04 UIU/ML (ref 0.45–4.5)
WBC # BLD AUTO: 8.5 X10E3/UL (ref 3.4–10.8)

## 2019-07-11 ENCOUNTER — OFFICE VISIT (OUTPATIENT)
Dept: FAMILY MEDICINE CLINIC | Age: 29
End: 2019-07-11

## 2019-07-11 VITALS
DIASTOLIC BLOOD PRESSURE: 70 MMHG | WEIGHT: 115.8 LBS | OXYGEN SATURATION: 97 % | HEIGHT: 64 IN | BODY MASS INDEX: 19.77 KG/M2 | RESPIRATION RATE: 18 BRPM | HEART RATE: 74 BPM | SYSTOLIC BLOOD PRESSURE: 105 MMHG | TEMPERATURE: 98 F

## 2019-07-11 DIAGNOSIS — L02.412 CUTANEOUS ABSCESS OF LEFT AXILLA: Primary | ICD-10-CM

## 2019-07-11 NOTE — PROGRESS NOTES
HPI       Chief Complaint: Abscess    Heaven You is a 34 y.o. female who presents to follow up abscess in L axilla. Seen in clinic 7/8/2019 for abscess in L underarm area. She was seen again 7/9 and US, I&D performed. Did not require packing or antibiotics. Pt states since then the lesion has increased in size and she feels like it has gotten deeper. No fevers, N/V. +chills. Feeling pressure in her axilla. Has been using coldpacks to help minimize the swelling. Not requiring tylenol or ibuprofen. Denies personal hx of abscesses in axilla but did have abscess under eyebrow at age 15-16 that required I&D. Can't remember if she required antibiotics. No other history of skin infections. PMHx:  Past Medical History:   Diagnosis Date    Asthma     HSV infection January 2011     Meds:   Current Outpatient Medications   Medication Sig Dispense Refill    [id number] chanelle-folic-dha (PRENATAL DHA+COMPLETE PRENATAL) -300 mg-mcg-mg cmpk Take  by mouth.  albuterol (VENTOLIN HFA) 90 mcg/actuation inhaler Take 2 Puffs by inhalation every six (6) hours as needed for Wheezing. 1 Inhaler 5    fluticasone furoate (ARNUITY ELLIPTA) 100 mcg/actuation dsdv inhaler Take 1 Puff by inhalation daily. 1 Inhaler 1     Allergies: Allergies   Allergen Reactions    Sulfa (Sulfonamide Antibiotics) Hives     ROS  Review of Systems   Constitutional: Negative for chills, fever and weight loss. Gastrointestinal: Negative for abdominal pain, constipation, diarrhea, nausea and vomiting. Skin:        Painful lesion in L axilla   Neurological: Negative for dizziness and headaches. Physical Exam:  Visit Vitals  /70 (BP 1 Location: Right arm, BP Patient Position: Sitting)   Pulse 74   Temp 98 °F (36.7 °C) (Oral)   Resp 18   Ht 5' 4\" (1.626 m)   Wt 115 lb 12.8 oz (52.5 kg)   SpO2 97%   Breastfeeding? No   BMI 19.88 kg/m²     Physical Exam   Constitutional: She is oriented to person, place, and time.  She appears well-developed and well-nourished. HENT:   Head: Normocephalic and atraumatic. Eyes: EOM are normal.   Neck: Normal range of motion. Neck supple. No thyromegaly present. Cardiovascular: Normal rate and regular rhythm. No murmur heard. Pulmonary/Chest: Effort normal and breath sounds normal. No respiratory distress. She has no wheezes. She has no rales. Abdominal: Soft. Bowel sounds are normal. She exhibits no distension. There is no tenderness. Neurological: She is alert and oriented to person, place, and time. Skin:   ~2.5cm firm raised erythematous lesion present in L axilla. Area with palpable fluctuance. Vitals reviewed. Assessment     34 y.o. female with:    ICD-10-CM ICD-9-CM    1. Cutaneous abscess of left axilla L02.412 682.3 DRAIN SKIN ABSCESS SIMPLE      AEROBIC BACTERIAL CULTURE      AMB POC US,EXTREMITY,NONVASCULAR,REAL-TIME IMAGE,LIMITED              Plan     1. Cutaneous abscess of left axilla  Bedside US showed fluid collection c/w abscess. Bedside I&D performed. Please see procedure note. Sending specimen of bloody pus discharge for culture. - DRAIN SKIN ABSCESS SIMPLE  - AEROBIC BACTERIAL CULTURE  - AMB POC US,EXTREMITY,NONVASCULAR,REAL-TIME IMAGE,LIMITED    Follow-up and Dispositions    · Return in about 2 days (around 7/13/2019) for repacking of abscess. Patient seen and discussed with Dr. Rich Garcia (attending physician)    I have discussed the diagnosis with the patient and the intended plan as seen in the above orders. The patient has received an after-visit summary and questions were answered concerning future plans. I have discussed medication side effects and warnings with the patient as well.     Orville Betancourt MD  Family Medicine Resident  PGY-3

## 2019-07-11 NOTE — PROGRESS NOTES
[facility]  OFFICE PROCEDURE PROGRESS NOTE  ULTRASOUND AND I&D      Chart reviewed for the following:   Yancy Herrera MD, have reviewed the History, Physical and updated the Allergic reactions for [address] performed immediately prior to start of procedure:   Yancy Herrera MD, have performed the following reviews on Isela Mediate prior to the start of the procedure:            * Patient was identified by name and date of birth   * Agreement on procedure being performed was verified  * Risks and Benefits explained to the patient  * Procedure site verified and marked as necessary  * Patient was positioned for comfort  * Consent was signed and verified     Time: 10:15AM  Date of procedure: 7/11/2019  Procedure performed by:  Leyda Goodwin MD  Provider assisted by: Dr. John Horne (Attending physician)  Patient assisted by: self  How tolerated by patient: tolerated the procedure well with no complications     Procedure: Point-of-Care Ultrasound of Superficial Skin Lesion  Indication: L axilla abscess    Findings:  Using the GE Logic e ultrasound with a linear probe, gray-scaled imaging evaluation of the L axilla was performed. Ultrasound showed approx 1cm x 1cm fluid-filled area with area of soft tissue swelling inferior to this. Impression:  Abscess approx 1cm x 1cm    Procedure: Incision and Drainage  After informed consent was obtained, chlorhexidine was used for cleansing. 1.5mL of 1% lidocaine was administered for anesthetic. Using sterile technique, incision and drainage of abscess was performed. 1cm incision made with a moderate amount of bloody pus appreciated from lesion. Incision was packed with plain 1/4\" packing strip. Pressure dressing was applied, and wound care instructions provided. Be alert for any signs of cutaneous infection. The procedure was well tolerated without complications.  Follow up: 2 days for packing to be removed and new packing placed.

## 2019-07-11 NOTE — PROGRESS NOTES
I saw and evaluated the patient, performing the key elements of the service. I discussed the findings, assessment and plan with the resident and agree with the resident's findings and plan as documented in the resident's note. I was present for and supervised the entire procedure. See resident note for details.  (Minor procedure <5 minutes)

## 2019-07-11 NOTE — PROGRESS NOTES
Chief Complaint   Patient presents with    Follow-up     abcess     1. Have you been to the ER, urgent care clinic since your last visit? Hospitalized since your last visit? No    2. Have you seen or consulted any other health care providers outside of the [address] [place] [address] since your last visit? Include any pap smears or colon screening. No  Visit Vitals  /70 (BP 1 Location: Right arm, BP Patient Position: Sitting)   Pulse 74   Temp 98 °F (36.7 °C) (Oral)   Resp 18   Ht 5' 4\" (1.626 m)   Wt 115 lb 12.8 oz (52.5 kg)   SpO2 97%   Breastfeeding?  No   BMI 19.88 kg/m²     Health Maintenance Due   Topic Date Due    Pneumococcal 0-64 years (1 of 1 - PPSV23) 08/03/1998    HPV Age 9Y-34Y (1 - Female 3-dose series) 08/03/2007    PAP AKA CERVICAL CYTOLOGY  08/03/2013

## 2019-07-11 NOTE — PATIENT INSTRUCTIONS
Skin Abscess: Care Instructions Your Care Instructions A skin abscess is a bacterial infection that forms a pocket of pus. A boil is a kind of skin abscess. The doctor may have cut an opening in the abscess so that the pus can drain out. You may have gauze in the cut so that the abscess will stay open and keep draining. You may need antibiotics. You will need to follow up with your doctor to make sure the infection has gone away. The doctor has checked you carefully, but problems can develop later. If you notice any problems or new symptoms, get medical treatment right away. Follow-up care is a key part of your treatment and safety. Be sure to make and go to all appointments, and call your doctor if you are having problems. It's also a good idea to know your test results and keep a list of the medicines you take. How can you care for yourself at home? · Apply warm and dry compresses, a heating pad set on low, or a hot water bottle 3 or 4 times a day for pain. Keep a cloth between the heat source and your skin. · If your doctor prescribed antibiotics, take them as directed. Do not stop taking them just because you feel better. You need to take the full course of antibiotics. · Take pain medicines exactly as directed. ? If the doctor gave you a prescription medicine for pain, take it as prescribed. ? If you are not taking a prescription pain medicine, ask your doctor if you can take an over-the-counter medicine. · Keep your bandage clean and dry. Change the bandage whenever it gets wet or dirty, or at least one time a day. · If the abscess was packed with gauze: 
? Keep follow-up appointments to have the gauze changed or removed. If the doctor instructed you to remove the gauze, follow the instructions you were given for how to remove it. ? After the gauze is removed, soak the area in warm water for 15 to 20 minutes 2 times a day, until the wound closes. When should you call for help? Call your doctor now or seek immediate medical care if: 
  · You have signs of worsening infection, such as: 
? Increased pain, swelling, warmth, or redness. ? Red streaks leading from the infected skin. ? Pus draining from the wound. ? A fever.  
 Watch closely for changes in your health, and be sure to contact your doctor if: 
  · You do not get better as expected. Where can you learn more? Go to http://haylie-katarina.info/. Enter J416 in the search box to learn more about \"Skin Abscess: Care Instructions. \" Current as of: April 17, 2018 Content Version: 11.9 © 4373-1718 [employer]. Care instructions adapted under license by [employer] (which disclaims liability or warranty for this information). If you have questions about a medical condition or this instruction, always ask your healthcare professional. [place] [address] any warranty or liability for your use of this information.

## 2019-07-12 ENCOUNTER — TELEPHONE (OUTPATIENT)
Dept: FAMILY MEDICINE CLINIC | Age: 29
End: 2019-07-12

## 2019-07-12 NOTE — PROGRESS NOTES
[address] Medicine Residency Attending Addendum:  I saw and evaluated the patient on the day of the encounter with Vivienne Baptiste MD  , performing the key elements of the service. I discussed the findings, assessment and plan with the resident and agree with the resident's findings and plan as documented in the resident's note.       Braden Alaniz MD, CAQSM, RMSK

## 2019-07-12 NOTE — TELEPHONE ENCOUNTER
Patient called stating the packing fell out and she would like a call back from the doctor because she has done this twice now and no one is giving her antibiotics. I spoke to nurse Wagner Moseley and was told the patient would need to come back in to get it repacked. I informed the patient and she stated she would like a call back from the doctor today to let her know if she comes back in to get it repacked if she will be given antibiotics otherwise she will just go to the ED. Please call patient.

## 2019-07-12 NOTE — TELEPHONE ENCOUNTER
Verified patient by 2 identifiers. Informed patient I was contacting her in regards to her telephone call. Patient states that she went to [facility] ER and they gave her Keflex and to keep her appt her at the office for Monday July 15, 2019. Will notify Dr Nargis James.

## 2019-07-13 NOTE — PROGRESS NOTES
CMP, CBC negative. HgbA1c in normal range. Mild Vit D deficiency, will monitor, does not warrant treatment at this time.  Hep C negative, HIV NR. TSH normal

## 2019-07-14 LAB
BACTERIA SPEC AEROBE CULT: ABNORMAL
BACTERIA SPEC AEROBE CULT: ABNORMAL
BACTERIA SPEC ANAEROBE CULT: ABNORMAL

## 2019-07-14 NOTE — PROGRESS NOTES
MRSA present. Given recurrence would probably need treatment. Will route to Dr. Wiggins Favorite to address tomorrow.

## 2019-07-14 NOTE — PROGRESS NOTES
Note MRSA on wound culture  Patient now s/p I&D x 2  If I&D successful she may not require abx but if still no improvement in lesion might consider starting abx  Patient has appt to see provider tomorrow AM  Results forwarded to provider to d/w patient     Nicola Fleming MD  07/14/19  4:57 PM

## 2019-07-15 ENCOUNTER — OFFICE VISIT (OUTPATIENT)
Dept: FAMILY MEDICINE CLINIC | Age: 29
End: 2019-07-15

## 2019-07-15 VITALS
WEIGHT: 115.8 LBS | OXYGEN SATURATION: 98 % | HEIGHT: 64 IN | DIASTOLIC BLOOD PRESSURE: 67 MMHG | RESPIRATION RATE: 16 BRPM | TEMPERATURE: 98.3 F | HEART RATE: 66 BPM | SYSTOLIC BLOOD PRESSURE: 100 MMHG | BODY MASS INDEX: 19.77 KG/M2

## 2019-07-15 DIAGNOSIS — L02.413 CUTANEOUS ABSCESS OF RIGHT UPPER EXTREMITY: Primary | ICD-10-CM

## 2019-07-15 RX ORDER — DOXYCYCLINE 100 MG/1
100 TABLET ORAL 2 TIMES DAILY
Qty: 28 TAB | Refills: 0 | Status: SHIPPED | OUTPATIENT
Start: 2019-07-15 | End: 2019-07-15

## 2019-07-15 RX ORDER — CEPHALEXIN 500 MG/1
CAPSULE ORAL
Refills: 0 | COMMUNITY
Start: 2019-07-12

## 2019-07-15 RX ORDER — CLINDAMYCIN HYDROCHLORIDE 150 MG/1
150 CAPSULE ORAL 2 TIMES DAILY
Qty: 14 CAP | Refills: 0 | Status: SHIPPED | OUTPATIENT
Start: 2019-07-15 | End: 2019-07-22

## 2019-07-15 NOTE — PROGRESS NOTES
I saw and evaluated the patient, performing the key elements of the service. I discussed the findings, assessment and plan with the resident and agree with the resident's findings and plan as documented in the resident's note, except that it was the left axilla where she had the abscess. .  There was some induration locally but no fluctuance.

## 2019-07-15 NOTE — PROGRESS NOTES
Chief Complaint   Patient presents with    ED Follow-up     Carline Vanessa      Blood pressure 100/67, pulse 66, temperature 98.3 °F (36.8 °C), temperature source Oral, resp. rate 16, height 5' 4\" (1.626 m), weight 115 lb 12.8 oz (52.5 kg), SpO2 98 %. 1. Have you been to the ER, urgent care clinic since your last visit? Hospitalized since your last visit? Yes When: 7/12/19 [facility] [facility]     2. Have you seen or consulted any other health care providers outside of the [address] [place] [address] since your last visit? Include any pap smears or colon screening.  No

## 2019-07-15 NOTE — TELEPHONE ENCOUNTER
Spoke to Mary Walsh in [address] is not covered by pt's insurance. Send Clinda 150 mg BID q7 days.  OK by Dr Tenisha Siu

## 2019-07-15 NOTE — TELEPHONE ENCOUNTER
Spoke to Heath Mejia with [address] regarding medication Doxycyline 100 mg not being covered by insurance. Krys states Doxycyline is not covered by [employer]. Call was then taken by Dr. Timothy Romero to verify what medication would be covered by patient's insurance.

## 2019-07-15 NOTE — PROGRESS NOTES
Progress Note    Patient: Ania Orozco MRN: [id number]  SSN: [id number]    YOB: 1990  Age: 34 y.o. Sex: female        Chief Complaint   Patient presents with    ED Follow-up     Rosio Lozano          Subjective:     ED follow up skin abscess in L axilla. Patient had progressing pain and went to ED for antibiotic Rx. Has been prescribed keflex. Pt states drainage stopped yesterday, no pain, no fever, overall feels better. Feels like small pocket of pus is still present. Pt interested in smoking cessation, concerned that she \"may have IBS\". States that has frequent diarrhea and constipation, constant stress. Current and past medical information:    Current Medications after this visit[de-identified]     Current Outpatient Medications   Medication Sig    doxycycline (ADOXA) 100 mg tablet Take 1 Tab by mouth two (2) times a day for 14 days.  [id number] chanelle-folic-dha (PRENATAL DHA+COMPLETE PRENATAL) -300 mg-mcg-mg cmpk Take  by mouth.  fluticasone furoate (ARNUITY ELLIPTA) 100 mcg/actuation dsdv inhaler Take 1 Puff by inhalation daily.  cephALEXin (KEFLEX) 500 mg capsule TAKE 1 CAPSULE BY MOUTH 4 TIMES DAILY FOR 5 DAYS    albuterol (VENTOLIN HFA) 90 mcg/actuation inhaler Take 2 Puffs by inhalation every six (6) hours as needed for Wheezing. No current facility-administered medications for this visit. Patient Active Problem List    Diagnosis Date Noted    H/O drug abuse 04/25/2013    Tobacco abuse 04/25/2013    Herpes 08/01/2011    RAD (reactive airway disease) 07/02/2010    Lower back pain 07/02/2010       Past Medical History:   Diagnosis Date    Asthma     HSV infection January 2011       Allergies   Allergen Reactions    Sulfa (Sulfonamide Antibiotics) Hives       Past Surgical History:   Procedure Laterality Date    HX ENDOSCOPY      HX WISDOM TEETH EXTRACTION  3/12/10         Review of Systems   Constitutional: Negative for chills, fever and weight loss. Gastrointestinal: Positive for constipation and diarrhea. Skin: Negative for itching and rash. Neurological: Negative. Negative for tremors. Social history: lives w/sister. Not sexaully active. UTD on vaccination. Smokes pack of cigarettes/day. Denies alcohol and drug use. FHx: Grandmother dies of cervical cancer         Objective:     Vitals:    07/15/19 0759   BP: 100/67   Pulse: 66   Resp: 16   Temp: 98.3 °F (36.8 °C)   TempSrc: Oral   SpO2: 98%   Weight: 115 lb 12.8 oz (52.5 kg)   Height: 5' 4\" (1.626 m)          Physical Exam     General appearance: patient is cooperative, well nourished, not in distress. VS normal.     L axillar area was examined: well healing abscess with residual induration is present. No drainage, no change in skin color, no swelling, no change in ROM of R extremity. Other Lab/Data Review:  Lab results reviewed. For significant abnormal values and values requiring intervention, see assessment and plan. MRSA+    Assessment:       ICD-10-CM ICD-9-CM    1. Cutaneous abscess of right upper extremity L02.413 682. 3          Plan of care:    1. MRSA+ skin abscess in L axilla:  Doxycycline 100 mg BID for 14 days  Soak and let drain, air dry. Avoid using perfume and agents that can clog sweat ducts or promote bacteria growth. Call and come back if fever, chills, increase of drainage, new indurations. 2. Lifestyle discussed: smoking cessation, avoid alcohol and drug use. Stress relief techniques and immune system health, hygiene been discussed. Patient want to quit smoking and wants to discuss her bowel health. Appointment with Dr Rosa Maria Rankin is scheduled on 07/22. Discussed diagnoses in detail with patient. Medication risks/benefits/side effects discussed with patient. All of the patient's questions were addressed. The patient understands and agrees with our plan of care.     The patient knows to call back if they are unsure of or forget any changes we discussed today or if the symptoms change. The patient received an After-Visit Summary which contains VS, orders, medication list and allergy list. This can be used as a \"mini-medical record\" should they have to seek medical care while out of town.         Patient is discussed and seen with Dr Divya Reina      Signed By: Rosie Baumann MD     July 15, 2019

## 2019-07-15 NOTE — TELEPHONE ENCOUNTER
Patient called stating her insurance does not cover doxycycline (ADOXA) 100 mg tablet and is requesting an alternative be called in to [address] [place] [address].

## 2020-01-09 ENCOUNTER — OFFICE VISIT (OUTPATIENT)
Dept: FAMILY MEDICINE CLINIC | Age: 30
End: 2020-01-09

## 2020-01-09 VITALS
SYSTOLIC BLOOD PRESSURE: 105 MMHG | HEIGHT: 64 IN | BODY MASS INDEX: 19.46 KG/M2 | RESPIRATION RATE: 19 BRPM | HEART RATE: 81 BPM | OXYGEN SATURATION: 98 % | WEIGHT: 114 LBS | DIASTOLIC BLOOD PRESSURE: 70 MMHG | TEMPERATURE: 98.3 F

## 2020-01-09 DIAGNOSIS — J37.0 CHRONIC LARYNGITIS: Primary | ICD-10-CM

## 2020-01-09 DIAGNOSIS — K21.9 GASTROESOPHAGEAL REFLUX DISEASE WITHOUT ESOPHAGITIS: ICD-10-CM

## 2020-01-09 RX ORDER — PANTOPRAZOLE SODIUM 40 MG/1
40 TABLET, DELAYED RELEASE ORAL DAILY
Qty: 30 TAB | Refills: 1 | Status: SHIPPED | OUTPATIENT
Start: 2020-01-09

## 2020-01-09 NOTE — PROGRESS NOTES
[address] [place] [address]    Subjective:   Estela Yung is a 34 y.o. female with history of   Past Medical History:   Diagnosis Date    Asthma     HSV infection January 2011     CC: Hoarseness  History provided by patient and chart review    HPI:  Here with complaints of voice hoarseness. Onset was 3 weeks ago. Symptoms have been unchanged since onset. Has not tried any remedies to improve her symptoms. Denies any preceding illness to hoarseness. Continues to smoke about 1/2 pack daily. Endorses post nasal drip and gerd symptoms with intermittent regurg symptoms which have been onfoing for the last year. Denies any sore throat, neck pain, dysphagia or odynophagia. No nausea, vomiting, fevers, chills or night sweats. Also denies any cough, chest pain, sob. Past Medical History:   Diagnosis Date    Asthma     HSV infection January 2011     Allergies   Allergen Reactions    Sulfa (Sulfonamide Antibiotics) Hives     Current Outpatient Medications on File Prior to Visit   Medication Sig Dispense Refill    cephALEXin (KEFLEX) 500 mg capsule TAKE 1 CAPSULE BY MOUTH 4 TIMES DAILY FOR 5 DAYS  0    [id number] chanelle-folic-dha (PRENATAL DHA+COMPLETE PRENATAL) -300 mg-mcg-mg cmpk Take  by mouth.  albuterol (VENTOLIN HFA) 90 mcg/actuation inhaler Take 2 Puffs by inhalation every six (6) hours as needed for Wheezing. 1 Inhaler 5    fluticasone furoate (ARNUITY ELLIPTA) 100 mcg/actuation dsdv inhaler Take 1 Puff by inhalation daily. 1 Inhaler 1     No current facility-administered medications on file prior to visit.       Family History   Problem Relation Age of Onset    Cancer Mother 39        Lung Cancer that spread    Asthma Mother     Heart Disease Father         area of heart not getting enough oxygen    Asthma Sister     Other Maternal Uncle         Hernia    Other Sister         Bronchitis     Social History     Socioeconomic History    Marital status: SINGLE     Spouse name: Not on file    Number of children: Not on file    Years of education: Not on file    Highest education level: Not on file   Tobacco Use    Smoking status: Current Every Day Smoker     Packs/day: 1.00     Years: 5.00     Pack years: 5.00     Types: Cigarettes    Smokeless tobacco: Never Used   Substance and Sexual Activity    Alcohol use: No    Drug use: Yes     Types: Marijuana    Sexual activity: Yes     Partners: Male     Birth control/protection: Condom, Pill     Past Surgical History:   Procedure Laterality Date    HX ENDOSCOPY      HX WISDOM TEETH EXTRACTION  3/12/10       Patient Active Problem List   Diagnosis Code    RAD (reactive airway disease) J45.909    Lower back pain M54.5    Herpes B00.9    H/O drug abuse ([facility] [address] F19.11    Tobacco abuse Z72.0       Review of Systems   Constitutional: Negative for chills, fever and malaise/fatigue. HENT: Negative for congestion, ear discharge, ear pain, hearing loss, sinus pain, sore throat and tinnitus. Eyes: Negative for blurred vision, double vision, photophobia and pain. Respiratory: Negative for cough, hemoptysis, sputum production, shortness of breath, wheezing and stridor. Cardiovascular: Negative for chest pain and palpitations. Gastrointestinal: Positive for heartburn. Negative for abdominal pain, nausea and vomiting. Neurological: Negative for dizziness, sensory change, speech change and headaches. Objective:     Visit Vitals  /70   Pulse 81   Temp 98.3 °F (36.8 °C)   Resp 19   Ht 5' 4\" (1.626 m)   Wt 114 lb (51.7 kg)   SpO2 98%   BMI 19.57 kg/m²        Physical Exam  Vitals signs reviewed. Constitutional:       General: She is not in acute distress. Appearance: She is normal weight. She is not ill-appearing. HENT:      Head: Normocephalic and atraumatic.       Right Ear: Tympanic membrane and ear canal normal.      Left Ear: Tympanic membrane and ear canal normal.      Nose: Nose normal. No congestion or rhinorrhea. Mouth/Throat:      Mouth: Mucous membranes are moist.      Pharynx: No oropharyngeal exudate or posterior oropharyngeal erythema. Eyes:      Extraocular Movements: Extraocular movements intact. Conjunctiva/sclera: Conjunctivae normal.      Pupils: Pupils are equal, round, and reactive to light. Neck:      Musculoskeletal: Normal range of motion and neck supple. No neck rigidity or muscular tenderness. Cardiovascular:      Rate and Rhythm: Normal rate and regular rhythm. Pulses: Normal pulses. Heart sounds: Normal heart sounds. Pulmonary:      Effort: Pulmonary effort is normal.      Breath sounds: Normal breath sounds. Abdominal:      General: Bowel sounds are normal.      Palpations: Abdomen is soft. Lymphadenopathy:      Cervical: No cervical adenopathy. Neurological:      Mental Status: She is alert. Pertinent Labs/Studies:      Assessment and orders:       1. Chronic laryngitis        -     Steam 10-15 mins daily, voice rest, PPI, smoking cessation, avoid spicy foods.        -     Advised otc antihistamines. Start protonix daily to treat underlying GERD symptoms        -     Obtain and f/u throat culture  -     pantoprazole (PROTONIX) 40 mg tablet; Take 1 Tab by mouth daily. , Normal, Disp-30 Tab, R-1  -     Refer to ENT if above regimen fails to improve symptoms.  -     RTO in 1-2 weeks    2. Gastroesophageal reflux disease without esophagitis        -     Trial of protonix  -     pantoprazole (PROTONIX) 40 mg tablet; Take 1 Tab by mouth daily. , Normal, Disp-30 Tab, R-1          I have reviewed patient medical and social history and medications. I have reviewed pertinent labs results and other data. I have discussed the diagnosis with the patient and the intended plan as seen in the above orders. The patient has received an after-visit summary and questions were answered concerning future plans.  I have discussed medication side effects and warnings with the patient as well.     Vesna Ramos MD  Resident [address] [facility]  01/12/20    Patient discussed with Dr. Ashley Gonzales, Attending Physician

## 2020-01-09 NOTE — LETTER
NOTIFICATION RETURN TO WORK / SCHOOL 
 
1/9/2020 4:31 PM 
 
Ms. Minerva Stroud [address] [facility] [address] To Whom It May Concern: 
 
Minerva Stroud is currently under the care of [address] [facility] [address]. She will return to work on January 10,2020. If there are questions or concerns please have the patient contact our office. Sincerely, Wallace Frias MD

## 2020-01-15 ENCOUNTER — OFFICE VISIT (OUTPATIENT)
Dept: FAMILY MEDICINE CLINIC | Age: 30
End: 2020-01-15

## 2020-01-15 VITALS
HEIGHT: 64 IN | DIASTOLIC BLOOD PRESSURE: 78 MMHG | HEART RATE: 100 BPM | TEMPERATURE: 98.7 F | SYSTOLIC BLOOD PRESSURE: 126 MMHG | BODY MASS INDEX: 19.29 KG/M2 | OXYGEN SATURATION: 98 % | RESPIRATION RATE: 19 BRPM | WEIGHT: 113 LBS

## 2020-01-15 DIAGNOSIS — R49.0 HOARSENESS: ICD-10-CM

## 2020-01-15 DIAGNOSIS — Z71.6 ENCOUNTER FOR SMOKING CESSATION COUNSELING: Primary | ICD-10-CM

## 2020-01-15 RX ORDER — BUPROPION HYDROCHLORIDE 150 MG/1
150 TABLET, EXTENDED RELEASE ORAL DAILY
Qty: 30 TAB | Refills: 0 | Status: SHIPPED | OUTPATIENT
Start: 2020-01-15

## 2020-01-15 NOTE — PROGRESS NOTES
[address] [place] [address]    Subjective:   Nolan Gaxiola is a 34 y.o. female with history of   Past Medical History:   Diagnosis Date    Asthma     HSV infection January 2011     CC: Follow-up on Hoarseness  History provided by patient and chart review    HPI:  Presents to clinic for continued hoarseness. Was seen 1 week ago for similar symptoms and prescribed PPI, Zyrtec and advised daily steam inhalation. States her symptoms are unchanged. She has not however started taken prescribed medications and continue to smoke about 1/2 to 1 pack of cigarettes daily. States her hoarseness is stable and unchanged, she denies any dysphagia, neck pain or selling. No cough or sob. Today, she is also prepared to quit smoking and would like to discuss smoking cessation therapy. Has smoked 1/2 to 1ppd since her mid teen years. Has never successfully quit in past. Has hx of MDD with suicidal attempt in 2013 requiring hospitalization. Past Medical History:   Diagnosis Date    Asthma     HSV infection January 2011     Allergies   Allergen Reactions    Sulfa (Sulfonamide Antibiotics) Hives     Current Outpatient Medications on File Prior to Visit   Medication Sig Dispense Refill    pantoprazole (PROTONIX) 40 mg tablet Take 1 Tab by mouth daily. 30 Tab 1    cephALEXin (KEFLEX) 500 mg capsule TAKE 1 CAPSULE BY MOUTH 4 TIMES DAILY FOR 5 DAYS  0    [id number] chanelle-folic-dha (PRENATAL DHA+COMPLETE PRENATAL) -300 mg-mcg-mg cmpk Take  by mouth.  albuterol (VENTOLIN HFA) 90 mcg/actuation inhaler Take 2 Puffs by inhalation every six (6) hours as needed for Wheezing. 1 Inhaler 5    fluticasone furoate (ARNUITY ELLIPTA) 100 mcg/actuation dsdv inhaler Take 1 Puff by inhalation daily. 1 Inhaler 1     No current facility-administered medications on file prior to visit.       Family History   Problem Relation Age of Onset    Cancer Mother 39        Lung Cancer that spread    Asthma Mother     Heart Disease Father         area of heart not getting enough oxygen    Asthma Sister     Other Maternal Uncle         Hernia    Other Sister         Bronchitis     Social History     Socioeconomic History    Marital status: SINGLE     Spouse name: Not on file    Number of children: Not on file    Years of education: Not on file    Highest education level: Not on file   Tobacco Use    Smoking status: Current Every Day Smoker     Packs/day: 1.00     Years: 5.00     Pack years: 5.00     Types: Cigarettes    Smokeless tobacco: Never Used   Substance and Sexual Activity    Alcohol use: No    Drug use: Yes     Types: Marijuana    Sexual activity: Yes     Partners: Male     Birth control/protection: Condom, Pill     Past Surgical History:   Procedure Laterality Date    HX ENDOSCOPY      HX WISDOM TEETH EXTRACTION  3/12/10       Patient Active Problem List   Diagnosis Code    RAD (reactive airway disease) J45.909    Lower back pain M54.5    Herpes B00.9    H/O drug abuse ([facility] [address] F19.11    Tobacco abuse Z72.0       Review of Systems   Constitutional: Negative for chills, fever, malaise/fatigue and weight loss. HENT: Negative for congestion, ear pain, hearing loss, sinus pain, sore throat and tinnitus. Eyes: Negative for blurred vision, double vision and photophobia. Respiratory: Negative for cough, hemoptysis, sputum production, shortness of breath and stridor. Cardiovascular: Negative for chest pain and palpitations. Gastrointestinal: Negative for heartburn, nausea and vomiting. Neurological: Negative for dizziness, tingling, tremors, sensory change, speech change and headaches. Psychiatric/Behavioral: Negative for depression, hallucinations, substance abuse and suicidal ideas. The patient is not nervous/anxious.           Objective:     Visit Vitals  /78   Pulse 100   Temp 98.7 °F (37.1 °C)   Resp 19   Ht 5' 4\" (1.626 m)   Wt 113 lb (51.3 kg)   SpO2 98%   BMI 19.40 kg/m²        Physical Exam  Constitutional:       Appearance: Normal appearance. She is normal weight. HENT:      Head: Normocephalic and atraumatic. Right Ear: Tympanic membrane and ear canal normal.      Left Ear: Tympanic membrane and ear canal normal.      Nose: Nose normal.      Mouth/Throat:      Mouth: Mucous membranes are moist.      Pharynx: No oropharyngeal exudate or posterior oropharyngeal erythema. Eyes:      Extraocular Movements: Extraocular movements intact. Conjunctiva/sclera: Conjunctivae normal.      Pupils: Pupils are equal, round, and reactive to light. Neck:      Musculoskeletal: Normal range of motion and neck supple. No neck rigidity or muscular tenderness. Vascular: No carotid bruit. Cardiovascular:      Rate and Rhythm: Normal rate and regular rhythm. Pulses: Normal pulses. Heart sounds: Normal heart sounds. Pulmonary:      Effort: Pulmonary effort is normal.      Breath sounds: Normal breath sounds. Abdominal:      General: Bowel sounds are normal. There is no distension. Palpations: Abdomen is soft. Tenderness: There is no tenderness. Musculoskeletal: Normal range of motion. Lymphadenopathy:      Cervical: No cervical adenopathy. Skin:     General: Skin is warm and dry. Capillary Refill: Capillary refill takes less than 2 seconds. Neurological:      General: No focal deficit present. Mental Status: She is alert and oriented to person, place, and time. Cranial Nerves: No cranial nerve deficit. Psychiatric:         Mood and Affect: Mood normal.         Behavior: Behavior normal.         Pertinent Labs/Studies:      Assessment and orders:     Diagnoses and all orders for this visit:    1. Encounter for smoking cessation counseling        -     Set today as quit date.  Discussed Wellbutrin use give comorbid MDD hx.        -     Discussed possibility ofinitial worsening MDD symptoms and possibility of increased suicidality with said medication. -     ER precautions given to patient. Plan follow-up in 3 days and 1 week  -     buPROPion SR (WELLBUTRIN SR) 150 mg SR tablet; Take 1 Tab by mouth daily. , Normal, Disp-30 Tab, R-0    2. Hoarseness  -     Advised smoking cessation as in 1 above. -     Start trial of PPI given concerns for underlying GERD  -     Personally made pt ENT appt for Feb 2nd with Dr. Sakshi Gandhi. I have reviewed patient medical and social history and medications. I have reviewed pertinent labs results and other data. I have discussed the diagnosis with the patient and the intended plan as seen in the above orders. The patient has received an after-visit summary and questions were answered concerning future plans. I have discussed medication side effects and warnings with the patient as well.     Eduard Ortega MD  Resident [address] [facility] [address] [place]  01/16/20    Patient discussed with Dr. Clarence Hayes, Attending Physician

## 2024-03-25 ENCOUNTER — TELEPHONE (OUTPATIENT)
Facility: CLINIC | Age: 34
End: 2024-03-25

## 2024-03-25 NOTE — TELEPHONE ENCOUNTER
----- Message from Sandhya Lee sent at 3/22/2024  3:33 PM EDT -----  Reason for Call: New Patient/New to Provider Appointment needed: New   Patient Request Appointment     QUESTIONS     Reason for appointment request? Available appointments did not meet   patient need      Additional Information for Provider? The patient called requesting to   schedule a new patient appointment to establish care, she is asking to be   placed on a cancellation list as she is available anytime on these days   4/2, 4/5, 4/8, 4/12, 4/16, 4/19, 4/23, 4/26, 4/30 and 5/3.   ---------------------------------------------------------------------------   --------------   CALL BACK INFO   [phone]; OK to leave message on [place]
